# Patient Record
Sex: MALE | Race: WHITE | NOT HISPANIC OR LATINO | Employment: FULL TIME | URBAN - METROPOLITAN AREA
[De-identification: names, ages, dates, MRNs, and addresses within clinical notes are randomized per-mention and may not be internally consistent; named-entity substitution may affect disease eponyms.]

---

## 2018-08-27 ENCOUNTER — OFFICE VISIT - HEALTHEAST (OUTPATIENT)
Dept: RHEUMATOLOGY | Facility: CLINIC | Age: 53
End: 2018-08-27

## 2018-08-27 DIAGNOSIS — M13.0 POLYARTHRITIS: ICD-10-CM

## 2018-08-27 DIAGNOSIS — M25.50 POLYARTHRALGIA: ICD-10-CM

## 2018-08-27 LAB
ALBUMIN SERPL-MCNC: 4 G/DL (ref 3.5–5)
ALT SERPL W P-5'-P-CCNC: 21 U/L (ref 0–45)
BASOPHILS # BLD AUTO: 0 THOU/UL (ref 0–0.2)
BASOPHILS NFR BLD AUTO: 1 % (ref 0–2)
C REACTIVE PROTEIN LHE: 0.5 MG/DL (ref 0–0.8)
CREAT SERPL-MCNC: 0.74 MG/DL (ref 0.7–1.3)
EOSINOPHIL # BLD AUTO: 0.1 THOU/UL (ref 0–0.4)
EOSINOPHIL NFR BLD AUTO: 1 % (ref 0–6)
ERYTHROCYTE [DISTWIDTH] IN BLOOD BY AUTOMATED COUNT: 11.6 % (ref 11–14.5)
ERYTHROCYTE [SEDIMENTATION RATE] IN BLOOD BY WESTERGREN METHOD: 2 MM/HR (ref 0–15)
GFR SERPL CREATININE-BSD FRML MDRD: >60 ML/MIN/1.73M2
HCT VFR BLD AUTO: 42.2 % (ref 40–54)
HGB BLD-MCNC: 14.4 G/DL (ref 14–18)
LYMPHOCYTES # BLD AUTO: 2 THOU/UL (ref 0.8–4.4)
LYMPHOCYTES NFR BLD AUTO: 23 % (ref 20–40)
MCH RBC QN AUTO: 31.4 PG (ref 27–34)
MCHC RBC AUTO-ENTMCNC: 34.1 G/DL (ref 32–36)
MCV RBC AUTO: 92 FL (ref 80–100)
MONOCYTES # BLD AUTO: 0.6 THOU/UL (ref 0–0.9)
MONOCYTES NFR BLD AUTO: 7 % (ref 2–10)
NEUTROPHILS # BLD AUTO: 6 THOU/UL (ref 2–7.7)
NEUTROPHILS NFR BLD AUTO: 69 % (ref 50–70)
PLATELET # BLD AUTO: 171 THOU/UL (ref 140–440)
PMV BLD AUTO: 10.2 FL (ref 7–10)
RBC # BLD AUTO: 4.58 MILL/UL (ref 4.4–6.2)
RHEUMATOID FACT SERPL-ACNC: <15 IU/ML (ref 0–30)
URATE SERPL-MCNC: 5.6 MG/DL (ref 3–8)
WBC: 8.7 THOU/UL (ref 4–11)

## 2018-08-27 RX ORDER — ACETAMINOPHEN 325 MG/1
650 TABLET ORAL EVERY 6 HOURS PRN
Status: SHIPPED | COMMUNITY
Start: 2018-08-27

## 2018-08-27 RX ORDER — IBUPROFEN 200 MG
200 TABLET ORAL EVERY 6 HOURS PRN
Status: SHIPPED | COMMUNITY
Start: 2018-08-27

## 2018-08-28 LAB
ANA SER QL: 0.2 U
CCP AB SER IA-ACNC: 0.6 U/ML
HBV SURFACE AG SERPL QL IA: NEGATIVE
HCV AB SERPL QL IA: NEGATIVE

## 2018-08-29 LAB — ANCA IGG TITR SER IF: NORMAL {TITER}

## 2018-10-01 ENCOUNTER — OFFICE VISIT - HEALTHEAST (OUTPATIENT)
Dept: RHEUMATOLOGY | Facility: CLINIC | Age: 53
End: 2018-10-01

## 2018-10-01 DIAGNOSIS — M06.4 INFLAMMATORY POLYARTHRITIS (H): ICD-10-CM

## 2018-10-01 DIAGNOSIS — Z79.899 HIGH RISK MEDICATION USE: ICD-10-CM

## 2018-10-01 DIAGNOSIS — M25.50 POLYARTHRALGIA: ICD-10-CM

## 2018-10-01 ASSESSMENT — MIFFLIN-ST. JEOR: SCORE: 1684.48

## 2019-01-07 ENCOUNTER — COMMUNICATION - HEALTHEAST (OUTPATIENT)
Dept: TELEHEALTH | Facility: CLINIC | Age: 54
End: 2019-01-07

## 2019-01-07 ENCOUNTER — OFFICE VISIT - HEALTHEAST (OUTPATIENT)
Dept: RHEUMATOLOGY | Facility: CLINIC | Age: 54
End: 2019-01-07

## 2019-01-07 DIAGNOSIS — Z79.899 HIGH RISK MEDICATION USE: ICD-10-CM

## 2019-01-07 DIAGNOSIS — M06.4 INFLAMMATORY POLYARTHRITIS (H): ICD-10-CM

## 2019-01-07 DIAGNOSIS — M25.50 POLYARTHRALGIA: ICD-10-CM

## 2019-04-18 ENCOUNTER — COMMUNICATION - HEALTHEAST (OUTPATIENT)
Dept: RHEUMATOLOGY | Facility: CLINIC | Age: 54
End: 2019-04-18

## 2019-04-18 DIAGNOSIS — M25.50 POLYARTHRALGIA: ICD-10-CM

## 2019-04-18 DIAGNOSIS — M06.4 INFLAMMATORY POLYARTHRITIS (H): ICD-10-CM

## 2019-05-29 ENCOUNTER — OFFICE VISIT - HEALTHEAST (OUTPATIENT)
Dept: RHEUMATOLOGY | Facility: CLINIC | Age: 54
End: 2019-05-29

## 2019-05-29 DIAGNOSIS — M06.4 INFLAMMATORY POLYARTHRITIS (H): ICD-10-CM

## 2019-05-29 DIAGNOSIS — Z79.899 HIGH RISK MEDICATION USE: ICD-10-CM

## 2019-05-29 DIAGNOSIS — M25.50 POLYARTHRALGIA: ICD-10-CM

## 2019-05-29 LAB
ALBUMIN SERPL-MCNC: 3.1 G/DL (ref 3.5–5)
ALT SERPL W P-5'-P-CCNC: 13 U/L (ref 0–45)
CREAT SERPL-MCNC: 0.8 MG/DL (ref 0.7–1.3)
ERYTHROCYTE [DISTWIDTH] IN BLOOD BY AUTOMATED COUNT: 10.8 % (ref 11–14.5)
GFR SERPL CREATININE-BSD FRML MDRD: >60 ML/MIN/1.73M2
HCT VFR BLD AUTO: 36.9 % (ref 40–54)
HGB BLD-MCNC: 12.3 G/DL (ref 14–18)
MCH RBC QN AUTO: 30.1 PG (ref 27–34)
MCHC RBC AUTO-ENTMCNC: 33.4 G/DL (ref 32–36)
MCV RBC AUTO: 90 FL (ref 80–100)
PLATELET # BLD AUTO: 448 THOU/UL (ref 140–440)
PMV BLD AUTO: 8.5 FL (ref 7–10)
RBC # BLD AUTO: 4.1 MILL/UL (ref 4.4–6.2)
WBC: 7.3 THOU/UL (ref 4–11)

## 2019-05-29 ASSESSMENT — MIFFLIN-ST. JEOR: SCORE: 1660.44

## 2019-06-24 ENCOUNTER — RECORDS - HEALTHEAST (OUTPATIENT)
Dept: ADMINISTRATIVE | Facility: OTHER | Age: 54
End: 2019-06-24

## 2019-06-25 ENCOUNTER — COMMUNICATION - HEALTHEAST (OUTPATIENT)
Dept: RHEUMATOLOGY | Facility: CLINIC | Age: 54
End: 2019-06-25

## 2019-06-25 DIAGNOSIS — M06.4 INFLAMMATORY POLYARTHRITIS (H): ICD-10-CM

## 2019-07-29 ENCOUNTER — OFFICE VISIT - HEALTHEAST (OUTPATIENT)
Dept: RHEUMATOLOGY | Facility: CLINIC | Age: 54
End: 2019-07-29

## 2019-07-29 DIAGNOSIS — Z79.899 HIGH RISK MEDICATION USE: ICD-10-CM

## 2019-07-29 DIAGNOSIS — M25.50 POLYARTHRALGIA: ICD-10-CM

## 2019-07-29 DIAGNOSIS — M06.4 INFLAMMATORY POLYARTHRITIS (H): ICD-10-CM

## 2019-08-27 ENCOUNTER — RECORDS - HEALTHEAST (OUTPATIENT)
Dept: ADMINISTRATIVE | Facility: OTHER | Age: 54
End: 2019-08-27

## 2019-09-24 ENCOUNTER — RECORDS - HEALTHEAST (OUTPATIENT)
Dept: ADMINISTRATIVE | Facility: OTHER | Age: 54
End: 2019-09-24

## 2019-09-25 ENCOUNTER — OFFICE VISIT - HEALTHEAST (OUTPATIENT)
Dept: RHEUMATOLOGY | Facility: CLINIC | Age: 54
End: 2019-09-25

## 2019-09-25 DIAGNOSIS — M06.4 INFLAMMATORY POLYARTHRITIS (H): ICD-10-CM

## 2019-09-25 DIAGNOSIS — Z79.899 HIGH RISK MEDICATION USE: ICD-10-CM

## 2019-09-25 DIAGNOSIS — M25.50 POLYARTHRALGIA: ICD-10-CM

## 2019-10-29 ENCOUNTER — COMMUNICATION - HEALTHEAST (OUTPATIENT)
Dept: RHEUMATOLOGY | Facility: CLINIC | Age: 54
End: 2019-10-29

## 2019-10-29 DIAGNOSIS — M25.50 POLYARTHRALGIA: ICD-10-CM

## 2019-10-29 DIAGNOSIS — M06.4 INFLAMMATORY POLYARTHRITIS (H): ICD-10-CM

## 2019-11-25 ENCOUNTER — COMMUNICATION - HEALTHEAST (OUTPATIENT)
Dept: RHEUMATOLOGY | Facility: CLINIC | Age: 54
End: 2019-11-25

## 2019-11-25 DIAGNOSIS — M06.4 INFLAMMATORY POLYARTHRITIS (H): ICD-10-CM

## 2019-11-25 DIAGNOSIS — M25.50 POLYARTHRALGIA: ICD-10-CM

## 2019-12-04 ENCOUNTER — RECORDS - HEALTHEAST (OUTPATIENT)
Dept: ADMINISTRATIVE | Facility: OTHER | Age: 54
End: 2019-12-04

## 2019-12-19 ENCOUNTER — COMMUNICATION - HEALTHEAST (OUTPATIENT)
Dept: RHEUMATOLOGY | Facility: CLINIC | Age: 54
End: 2019-12-19

## 2019-12-19 DIAGNOSIS — M25.50 POLYARTHRALGIA: ICD-10-CM

## 2019-12-19 DIAGNOSIS — M06.4 INFLAMMATORY POLYARTHRITIS (H): ICD-10-CM

## 2020-02-06 ENCOUNTER — RECORDS - HEALTHEAST (OUTPATIENT)
Dept: ADMINISTRATIVE | Facility: OTHER | Age: 55
End: 2020-02-06

## 2020-02-10 ENCOUNTER — COMMUNICATION - HEALTHEAST (OUTPATIENT)
Dept: RHEUMATOLOGY | Facility: CLINIC | Age: 55
End: 2020-02-10

## 2020-02-10 DIAGNOSIS — M25.50 POLYARTHRALGIA: ICD-10-CM

## 2020-02-10 DIAGNOSIS — M06.4 INFLAMMATORY POLYARTHRITIS (H): ICD-10-CM

## 2020-02-24 ENCOUNTER — RECORDS - HEALTHEAST (OUTPATIENT)
Dept: ADMINISTRATIVE | Facility: CLINIC | Age: 55
End: 2020-02-24

## 2020-02-25 ENCOUNTER — RECORDS - HEALTHEAST (OUTPATIENT)
Dept: ADMINISTRATIVE | Facility: OTHER | Age: 55
End: 2020-02-25

## 2020-03-09 ENCOUNTER — COMMUNICATION - HEALTHEAST (OUTPATIENT)
Dept: RHEUMATOLOGY | Facility: CLINIC | Age: 55
End: 2020-03-09

## 2020-03-09 ENCOUNTER — OFFICE VISIT - HEALTHEAST (OUTPATIENT)
Dept: RHEUMATOLOGY | Facility: CLINIC | Age: 55
End: 2020-03-09

## 2020-03-09 DIAGNOSIS — M25.50 POLYARTHRALGIA: ICD-10-CM

## 2020-03-09 DIAGNOSIS — M06.4 INFLAMMATORY POLYARTHRITIS (H): ICD-10-CM

## 2020-03-09 DIAGNOSIS — Z79.899 HIGH RISK MEDICATION USE: ICD-10-CM

## 2020-03-09 ASSESSMENT — MIFFLIN-ST. JEOR: SCORE: 1676.31

## 2020-05-22 ENCOUNTER — COMMUNICATION - HEALTHEAST (OUTPATIENT)
Dept: RHEUMATOLOGY | Facility: CLINIC | Age: 55
End: 2020-05-22

## 2020-05-22 DIAGNOSIS — M06.4 INFLAMMATORY POLYARTHRITIS (H): ICD-10-CM

## 2020-05-22 DIAGNOSIS — Z79.899 HIGH RISK MEDICATION USE: ICD-10-CM

## 2020-05-27 ENCOUNTER — COMMUNICATION - HEALTHEAST (OUTPATIENT)
Dept: RHEUMATOLOGY | Facility: CLINIC | Age: 55
End: 2020-05-27

## 2020-05-27 DIAGNOSIS — M25.50 POLYARTHRALGIA: ICD-10-CM

## 2020-05-27 DIAGNOSIS — M06.4 INFLAMMATORY POLYARTHRITIS (H): ICD-10-CM

## 2020-05-28 ENCOUNTER — RECORDS - HEALTHEAST (OUTPATIENT)
Dept: ADMINISTRATIVE | Facility: OTHER | Age: 55
End: 2020-05-28

## 2020-05-28 LAB
ALT SERPL W/O P-5'-P-CCNC: 27 U/L
CREAT SERPL-MCNC: 0.6 MG/DL (ref 0.7–1.4)

## 2020-06-16 ENCOUNTER — COMMUNICATION - HEALTHEAST (OUTPATIENT)
Dept: RHEUMATOLOGY | Facility: CLINIC | Age: 55
End: 2020-06-16

## 2020-06-16 DIAGNOSIS — M25.50 POLYARTHRALGIA: ICD-10-CM

## 2020-06-16 DIAGNOSIS — M06.4 INFLAMMATORY POLYARTHRITIS (H): ICD-10-CM

## 2020-06-17 ENCOUNTER — RECORDS - HEALTHEAST (OUTPATIENT)
Dept: HEALTH INFORMATION MANAGEMENT | Facility: CLINIC | Age: 55
End: 2020-06-17

## 2020-08-06 ENCOUNTER — AMBULATORY - HEALTHEAST (OUTPATIENT)
Dept: RHEUMATOLOGY | Facility: CLINIC | Age: 55
End: 2020-08-06

## 2020-08-06 DIAGNOSIS — M06.4 INFLAMMATORY POLYARTHRITIS (H): ICD-10-CM

## 2020-08-31 ENCOUNTER — COMMUNICATION - HEALTHEAST (OUTPATIENT)
Dept: RHEUMATOLOGY | Facility: CLINIC | Age: 55
End: 2020-08-31

## 2020-08-31 DIAGNOSIS — M06.4 INFLAMMATORY POLYARTHRITIS (H): ICD-10-CM

## 2020-08-31 DIAGNOSIS — M25.50 POLYARTHRALGIA: ICD-10-CM

## 2020-09-10 ENCOUNTER — OFFICE VISIT - HEALTHEAST (OUTPATIENT)
Dept: RHEUMATOLOGY | Facility: CLINIC | Age: 55
End: 2020-09-10

## 2020-09-10 DIAGNOSIS — Z79.899 HIGH RISK MEDICATION USE: ICD-10-CM

## 2020-09-10 DIAGNOSIS — M06.4 INFLAMMATORY POLYARTHRITIS (H): ICD-10-CM

## 2020-09-10 DIAGNOSIS — M25.50 POLYARTHRALGIA: ICD-10-CM

## 2020-09-11 ENCOUNTER — RECORDS - HEALTHEAST (OUTPATIENT)
Dept: ADMINISTRATIVE | Facility: OTHER | Age: 55
End: 2020-09-11

## 2020-09-11 LAB — CREAT SERPL-MCNC: 0.8 MG/DL (ref 0.7–1.4)

## 2020-09-15 ENCOUNTER — RECORDS - HEALTHEAST (OUTPATIENT)
Dept: HEALTH INFORMATION MANAGEMENT | Facility: CLINIC | Age: 55
End: 2020-09-15

## 2020-09-28 ENCOUNTER — COMMUNICATION - HEALTHEAST (OUTPATIENT)
Dept: RHEUMATOLOGY | Facility: CLINIC | Age: 55
End: 2020-09-28

## 2020-09-28 DIAGNOSIS — M06.4 INFLAMMATORY POLYARTHRITIS (H): ICD-10-CM

## 2020-09-28 DIAGNOSIS — Z79.899 HIGH RISK MEDICATION USE: ICD-10-CM

## 2020-10-13 ENCOUNTER — COMMUNICATION - HEALTHEAST (OUTPATIENT)
Dept: RHEUMATOLOGY | Facility: CLINIC | Age: 55
End: 2020-10-13

## 2020-10-13 DIAGNOSIS — M25.50 POLYARTHRALGIA: ICD-10-CM

## 2020-10-13 DIAGNOSIS — M06.4 INFLAMMATORY POLYARTHRITIS (H): ICD-10-CM

## 2020-12-04 ENCOUNTER — RECORDS - HEALTHEAST (OUTPATIENT)
Dept: ADMINISTRATIVE | Facility: OTHER | Age: 55
End: 2020-12-04

## 2020-12-04 LAB
ALT SERPL W/O P-5'-P-CCNC: 21 U/L
CREAT SERPL-MCNC: 0.6 MG/DL (ref 0.7–1.4)
GFR ESTIMATE EXT - HISTORICAL: >60 ML/MIN/1.73M2

## 2020-12-08 ENCOUNTER — RECORDS - HEALTHEAST (OUTPATIENT)
Dept: HEALTH INFORMATION MANAGEMENT | Facility: CLINIC | Age: 55
End: 2020-12-08

## 2020-12-10 ENCOUNTER — COMMUNICATION - HEALTHEAST (OUTPATIENT)
Dept: RHEUMATOLOGY | Facility: CLINIC | Age: 55
End: 2020-12-10

## 2020-12-10 DIAGNOSIS — M25.50 POLYARTHRALGIA: ICD-10-CM

## 2020-12-10 DIAGNOSIS — M06.4 INFLAMMATORY POLYARTHRITIS (H): ICD-10-CM

## 2020-12-27 ENCOUNTER — COMMUNICATION - HEALTHEAST (OUTPATIENT)
Dept: RHEUMATOLOGY | Facility: CLINIC | Age: 55
End: 2020-12-27

## 2020-12-27 DIAGNOSIS — M06.4 INFLAMMATORY POLYARTHRITIS (H): ICD-10-CM

## 2020-12-27 DIAGNOSIS — Z79.899 HIGH RISK MEDICATION USE: ICD-10-CM

## 2021-01-15 ENCOUNTER — COMMUNICATION - HEALTHEAST (OUTPATIENT)
Dept: RHEUMATOLOGY | Facility: CLINIC | Age: 56
End: 2021-01-15

## 2021-01-15 DIAGNOSIS — M25.50 POLYARTHRALGIA: ICD-10-CM

## 2021-01-15 DIAGNOSIS — M06.4 INFLAMMATORY POLYARTHRITIS (H): ICD-10-CM

## 2021-03-02 ENCOUNTER — RECORDS - HEALTHEAST (OUTPATIENT)
Dept: ADMINISTRATIVE | Facility: OTHER | Age: 56
End: 2021-03-02

## 2021-03-02 LAB
ALT SERPL W/O P-5'-P-CCNC: 24 U/L
CREAT SERPL-MCNC: 0.8 MG/DL (ref 0.7–1.4)

## 2021-03-04 ENCOUNTER — RECORDS - HEALTHEAST (OUTPATIENT)
Dept: HEALTH INFORMATION MANAGEMENT | Facility: CLINIC | Age: 56
End: 2021-03-04

## 2021-03-10 ENCOUNTER — COMMUNICATION - HEALTHEAST (OUTPATIENT)
Dept: RHEUMATOLOGY | Facility: CLINIC | Age: 56
End: 2021-03-10

## 2021-03-10 DIAGNOSIS — M06.4 INFLAMMATORY POLYARTHRITIS (H): ICD-10-CM

## 2021-03-10 DIAGNOSIS — M25.50 POLYARTHRALGIA: ICD-10-CM

## 2021-03-11 ENCOUNTER — OFFICE VISIT - HEALTHEAST (OUTPATIENT)
Dept: RHEUMATOLOGY | Facility: CLINIC | Age: 56
End: 2021-03-11

## 2021-03-11 DIAGNOSIS — M25.50 POLYARTHRALGIA: ICD-10-CM

## 2021-03-11 DIAGNOSIS — Z79.899 HIGH RISK MEDICATION USE: ICD-10-CM

## 2021-03-11 DIAGNOSIS — D69.6 THROMBOCYTOPENIA (H): ICD-10-CM

## 2021-03-11 DIAGNOSIS — M06.4 INFLAMMATORY POLYARTHRITIS (H): ICD-10-CM

## 2021-04-09 ENCOUNTER — COMMUNICATION - HEALTHEAST (OUTPATIENT)
Dept: RHEUMATOLOGY | Facility: CLINIC | Age: 56
End: 2021-04-09

## 2021-04-09 DIAGNOSIS — Z79.899 HIGH RISK MEDICATION USE: ICD-10-CM

## 2021-04-09 DIAGNOSIS — M25.50 POLYARTHRALGIA: ICD-10-CM

## 2021-04-09 DIAGNOSIS — M06.4 INFLAMMATORY POLYARTHRITIS (H): ICD-10-CM

## 2021-04-09 RX ORDER — FOLIC ACID 1 MG/1
1 TABLET ORAL DAILY
Qty: 100 TABLET | Refills: 0 | Status: SHIPPED | OUTPATIENT
Start: 2021-04-09 | End: 2021-07-08

## 2021-04-09 RX ORDER — HYDROXYCHLOROQUINE SULFATE 200 MG/1
200 TABLET, FILM COATED ORAL 2 TIMES DAILY
Qty: 180 TABLET | Refills: 0 | Status: SHIPPED | OUTPATIENT
Start: 2021-04-09 | End: 2021-12-21

## 2021-05-29 NOTE — PROGRESS NOTES
ASSESSMENT AND PLAN:  Shaan Mart 54 y.o. male is seen here on 05/29/19 for follow-up of inflammatory polyarthritis predominantly affecting his hands, hydroxychloroquine initially help but he has been hurting more despite not pain at work, which typically makes his symptoms better.  We discussed options.  Starting methotrexate, literature on this is provided.  Starting him on 10mg/week with folic acid.  He is aware of the lab importance.  This can be either done in the clinic here or at his primary office at [place].         Diagnoses and all orders for this visit:    Inflammatory polyarthritis (H)  -     ALT (SGPT); Standing  -     Albumin; Standing  -     Creatinine; Standing  -     HM2(CBC w/o Differential); Standing  -     ALT (SGPT)  -     Albumin  -     Creatinine  -     HM2(CBC w/o Differential)  -     methotrexate 2.5 MG tablet; Take 4 tablets (10 mg total) by mouth once a week.  Dispense: 18 tablet; Refill: 0  -     folic acid (FOLVITE) 1 MG tablet; Take 1 tablet (1 mg total) by mouth daily.  Dispense: 30 tablet; Refill: 11    High risk medication use  -     ALT (SGPT); Standing  -     Albumin; Standing  -     Creatinine; Standing  -     HM2(CBC w/o Differential); Standing  -     ALT (SGPT)  -     Albumin  -     Creatinine  -     HM2(CBC w/o Differential)  -     folic acid (FOLVITE) 1 MG tablet; Take 1 tablet (1 mg total) by mouth daily.  Dispense: 30 tablet; Refill: 11    Polyarthralgia      HISTORY OF PRESENTING ILLNESS:  Shaan Mart, 54 y.o., male is here for follow-up.  He has inflammatory polyarthritis.  He noted that his pain level is troublesome at moderately severe in the hands, currently he is off work having had surgery on the left rotator cuff 4 weeks ago.  While he was working the symptoms would get worse.  Even now he noted pain level is elevated to earlier.  He has no difficulty doing most of his day-to-day activities there is no prolonged stiffness in the morning.  He is not taking  hydroxychloroquine 200 mg twice daily.  Do the house in the examination shortly.    There is no fever weight loss blurry vision eye redness mouth ulcer nausea.  While he is improved he is not to the same order as the improvement he noted with prednisone 10 mg daily. In  the past where he had pain level as high as 8/10 and interfered with a variety of day-to-day activities.  His morning stiffness is now nonexistent compared with an hour few weeks ago.  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  He reports that he has had joint pains going back several years.  He feels it probably is at least 5 years.  He has pain in his hands, wrists, ankles, feet.  He has had multiple injuries.  This is affected his right wrist where he had surgery done last year.  He is also had a fall from a ladder after which he had swelling on the left wrist.  He also recalls injury to the left ankle when he strained it.  He has had 2 ACL repairs of the right knee.  In this background he reports that the pain in the hands, ankles and the feet is most prominent issue for him.  This is bothersome first thing in the morning after he has taken ibuprofen there is some relief.  But by afternoon he has more pain.  He noted pain level to be moderately severe more so on the left and the right hand, 8.0/10, interfering with a variety of day-to-day activities.  He has stiffness in the morning that can last easily an hour and 1 hour.  He is not a smoker alcohol occasionally.  The more he is using his joints the worse it gets.  He was at chiropractors.  He took x-rays of the hands, he also had an MRI of the left ankle not too long ago.  We are going to request the reports of the MRI.  He reports ibuprofen controls his symptoms.  He has seen swelling such as of the left wrist left ankle and the MCPs.  He reports no personal or family history of psoriasis ulcerative colitis, Crohn's disease.  There is no family history of rheumatoid arthritis and ankylosing spondylitis  "or lupus.  He describes himself otherwise in good health.    Further historical information and ADL limitations as noted in the multidimensional health assessment questionnaire attached in the EMR.   ALLERGIES:Patient has no known allergies.    PAST MEDICAL/ACTIVE PROBLEMS/MEDICATION/ FAMILY HISTORY/SOCIAL DATA:  The patient has a family history of  No past medical history on file.  Social History     Tobacco Use   Smoking Status Never Smoker   Smokeless Tobacco Never Used     Patient Active Problem List   Diagnosis     Polyarthralgia     Inflammatory polyarthritis (H)     High risk medication use     Current Outpatient Medications   Medication Sig Dispense Refill     acetaminophen (TYLENOL) 325 MG tablet Take 650 mg by mouth every 6 (six) hours as needed for pain.       hydroxychloroquine (PLAQUENIL) 200 mg tablet TAKE 1 TABLET (200 MG TOTAL) BY MOUTH 2 (TWO) TIMES A DAY. 180 tablet 0     ibuprofen (ADVIL,MOTRIN) 200 MG tablet Take 200 mg by mouth every 6 (six) hours as needed for pain.       No current facility-administered medications for this visit.      DETAILED EXAMINATION  05/29/19  :  Vitals:    05/29/19 1301   BP: 120/70   Pulse: (!) 104   Weight: 181 lb 11.2 oz (82.4 kg)   Height: 5' 10\" (1.778 m)     Alert oriented. Head including the face is examined for malar rash, heliotropes, scarring, lupus pernio. Eyes examined for redness such as in episcleritis/scleritis, periorbital lesions.   Neck/ Face examined for parotid gland swelling, range of motion of neck.  Left upper and lower and right upper and lower extremities examined for tenderness, swelling, warmth of the appendicular joints, range of motion, edema, rash.  Some of the important findings included: His left arm is in a brace, he has synovitis such as MCPs #2 and 3 more prominent on the right side.  Knees without JLT bony hypertrophy right knee medial aspect.  He does not have dactylitis.        LAB / IMAGING DATA:  ALT   Date Value Ref Range Status "   08/27/2018 21 0 - 45 U/L Final     Albumin   Date Value Ref Range Status   08/27/2018 4.0 3.5 - 5.0 g/dL Final     Creatinine   Date Value Ref Range Status   08/27/2018 0.74 0.70 - 1.30 mg/dL Final       WBC   Date Value Ref Range Status   08/27/2018 8.7 4.0 - 11.0 thou/uL Final     Hemoglobin   Date Value Ref Range Status   08/27/2018 14.4 14.0 - 18.0 g/dL Final     Platelets   Date Value Ref Range Status   08/27/2018 171 140 - 440 thou/uL Final       Lab Results   Component Value Date    RF <15.0 08/27/2018    SEDRATE 2 08/27/2018

## 2021-05-30 NOTE — PROGRESS NOTES
ASSESSMENT AND PLAN:  Shaan Mart 54 y.o. male is seen here on 07/29/19 for follow-up.  He has polyarthralgia in association with inflammatory polyarthritis, without evidence serologically of autoimmunity, predominantly of his hands, he has noted improvement in swelling the pain is improved somewhat.  He has been on hydroxychloroquine, and 10 mg/week of methotrexate with folic acid.  Most recent labs are within acceptable range.  Increase methotrexate to 15 mg/week stay at the current course with hydroxychloroquine and he did have his eyes examined.  He will check his labs every 4 week.    Will meet here in 2 months. This can be either done in the clinic here or at his primary office at [place].         Diagnoses and all orders for this visit:    Inflammatory polyarthritis (H)  -     hydroxychloroquine (PLAQUENIL) 200 mg tablet; Take 1 tablet (200 mg total) by mouth 2 (two) times a day.  Dispense: 180 tablet; Refill: 0  -     methotrexate 2.5 MG tablet; Take 6 tablets (15 mg total) by mouth once a week.  Dispense: 24 tablet; Refill: 1  -     ALT (SGPT); Standing  -     Albumin; Standing  -     Creatinine; Standing  -     HM2(CBC w/o Differential); Standing    Polyarthralgia  -     hydroxychloroquine (PLAQUENIL) 200 mg tablet; Take 1 tablet (200 mg total) by mouth 2 (two) times a day.  Dispense: 180 tablet; Refill: 0  -     methotrexate 2.5 MG tablet; Take 6 tablets (15 mg total) by mouth once a week.  Dispense: 24 tablet; Refill: 1    High risk medication use      HISTORY OF PRESENTING ILLNESS:  Shaan Mart, 54 y.o., male is here for follow-up.  He has inflammatory polyarthritis.  He has rotator cuff tendinopathy.  He noted tolerability to methotrexate of which she has been taking 10 mg/week with hydroxychloroquine, folic acid daily.  Most recent labs reviewed within acceptable range, at her son.  He has noted pain level to be mild this is somewhat better than before.  More impressive has been lessening of the  swelling in the third MCP areas.  He has residual pain in his shoulders from the prior rotator cuff lesions.  He is able to do most of his day-to-day activities without any or with some difficulty.   There is no fever weight loss blurry vision eye redness mouth ulcer nausea.  He noted pain level now down to 4.0/10 from the high of 8.0/10.  As noted in the previous visit I n the past where he had pain level as high as 8/10 and interfered with a variety of day-to-day activities.  His morning stiffness is now nonexistent compared with an hour few weeks ago.  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  He reports that he has had joint pains going back several years.  He feels it probably is at least 5 years.  He has pain in his hands, wrists, ankles, feet.  He has had multiple injuries.  This is affected his right wrist where he had surgery done last year.  He is also had a fall from a ladder after which he had swelling on the left wrist.  He also recalls injury to the left ankle when he strained it.  He has had 2 ACL repairs of the right knee.  In this background he reports that the pain in the hands, ankles and the feet is most prominent issue for him.  This is bothersome first thing in the morning after he has taken ibuprofen there is some relief.  But by afternoon he has more pain.  He noted pain level to be moderately severe more so on the left and the right hand, 8.0/10, interfering with a variety of day-to-day activities.  He has stiffness in the morning that can last easily an hour and 1 hour.  He is not a smoker alcohol occasionally.  The more he is using his joints the worse it gets.  He was at chiropractors.  He took x-rays of the hands, he also had an MRI of the left ankle not too long ago.  We are going to request the reports of the MRI.  He reports ibuprofen controls his symptoms.  He has seen swelling such as of the left wrist left ankle and the MCPs.  He reports no personal or family history of psoriasis ulcerative  colitis, Crohn's disease.  There is no family history of rheumatoid arthritis and ankylosing spondylitis or lupus.  He describes himself otherwise in good health.    Further historical information and ADL limitations as noted in the multidimensional health assessment questionnaire attached in the EMR.   ALLERGIES:Patient has no known allergies.    PAST MEDICAL/ACTIVE PROBLEMS/MEDICATION/ FAMILY HISTORY/SOCIAL DATA:  The patient has a family history of  No past medical history on file.  Social History     Tobacco Use   Smoking Status Never Smoker   Smokeless Tobacco Never Used     Patient Active Problem List   Diagnosis     Polyarthralgia     Inflammatory polyarthritis (H)     High risk medication use     Current Outpatient Medications   Medication Sig Dispense Refill     acetaminophen (TYLENOL) 325 MG tablet Take 650 mg by mouth every 6 (six) hours as needed for pain.       folic acid (FOLVITE) 1 MG tablet Take 1 tablet (1 mg total) by mouth daily. 30 tablet 11     ibuprofen (ADVIL,MOTRIN) 200 MG tablet Take 200 mg by mouth every 6 (six) hours as needed for pain.       methotrexate 2.5 MG tablet Take 6 tablets (15 mg total) by mouth once a week. 24 tablet 1     hydroxychloroquine (PLAQUENIL) 200 mg tablet Take 1 tablet (200 mg total) by mouth 2 (two) times a day. 180 tablet 0     No current facility-administered medications for this visit.      DETAILED EXAMINATION  07/29/19  :  Vitals:    07/29/19 0935   BP: 124/80   Patient Site: Right Arm   Patient Position: Sitting   Cuff Size: Adult Regular   Temp: (!) 84  F (28.9  C)   Weight: 181 lb (82.1 kg)     Alert oriented. Head including the face is examined for malar rash, heliotropes, scarring, lupus pernio. Eyes examined for redness such as in episcleritis/scleritis, periorbital lesions.   Neck/ Face examined for parotid gland swelling, range of motion of neck.  Left upper and lower and right upper and lower extremities examined for tenderness, swelling, warmth of  the appendicular joints, range of motion, edema, rash.  Some of the important findings included: Mild tenderness on the left third MCP moderate on the right side, with subtle swelling yet on the right third MCP.    He does not have dactylitis.        LAB / IMAGING DATA:  ALT   Date Value Ref Range Status   05/29/2019 13 0 - 45 U/L Final   08/27/2018 21 0 - 45 U/L Final     Albumin   Date Value Ref Range Status   05/29/2019 3.1 (L) 3.5 - 5.0 g/dL Final   08/27/2018 4.0 3.5 - 5.0 g/dL Final     Creatinine   Date Value Ref Range Status   05/29/2019 0.80 0.70 - 1.30 mg/dL Final   08/27/2018 0.74 0.70 - 1.30 mg/dL Final       WBC   Date Value Ref Range Status   05/29/2019 7.3 4.0 - 11.0 thou/uL Final   08/27/2018 8.7 4.0 - 11.0 thou/uL Final     Hemoglobin   Date Value Ref Range Status   05/29/2019 12.3 (L) 14.0 - 18.0 g/dL Final   08/27/2018 14.4 14.0 - 18.0 g/dL Final     Platelets   Date Value Ref Range Status   05/29/2019 448 (H) 140 - 440 thou/uL Final   08/27/2018 171 140 - 440 thou/uL Final       Lab Results   Component Value Date    RF <15.0 08/27/2018    SEDRATE 2 08/27/2018

## 2021-06-01 VITALS — WEIGHT: 187 LBS

## 2021-06-01 NOTE — PROGRESS NOTES
ASSESSMENT AND PLAN:  Shaan Mart 54 y.o. male is seen here on 09/25/19 for follow-up of inflammatory polyarthritis, without serologic evidence of autoimmunity, doing great with methotrexate and hydroxychloroquine recent labs are done at his primary clinic at [facility] reviewed within normal range.  He had right shoulder rotator cuff surgery recently making good progress.  He is to continue the current regimen.  He wondered how long should he stay on this.  We discussed this.  Reminded him of eye examination.  Follow-up at this time in 3 months with labs just prior.            Diagnoses and all orders for this visit:    Inflammatory polyarthritis (H)  -     methotrexate 2.5 MG tablet; Take 6 tablets (15 mg total) by mouth once a week.  Dispense: 24 tablet; Refill: 1    Polyarthralgia  -     methotrexate 2.5 MG tablet; Take 6 tablets (15 mg total) by mouth once a week.  Dispense: 24 tablet; Refill: 1    High risk medication use      HISTORY OF PRESENTING ILLNESS:  Shaan Mart, 54 y.o., male is here for follow-up.  He has inflammatory polyarthritis.  He has rotator cuff tendinopathy.  He has been doing so much better.  There is no pain apart from his right shoulder which she had operated on the few days ago.  He has reported no stiffness swelling or pain in his hands.  He is able to do all his day-to-day activities from that perspective.  He has tolerated increased dose of methotrexate, hydroxychloroquine.  Recent labs done at [facility] were within normal range.  He is on folic acid.    There is no fever weight loss blurry vision eye redness mouth ulcer nausea.  He noted pain level now down to 4.0/10 from the high of 8.0/10.  As noted in the previous visit I n the past where he had pain level as high as 8/10 and interfered with a variety of day-to-day activities.  His morning stiffness is now nonexistent compared with an hour few weeks ago.  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  He feels it probably is at least 5 years.  He has pain in his hands, wrists, ankles, feet.  He has had multiple injuries.  This is affected his right wrist where he had surgery done last year.  He is also had a fall from a ladder after which he had swelling on the left wrist.  He also recalls injury to the left ankle when he strained it.  He has had 2 ACL repairs of the right knee.  In this background he reports that the pain in the hands, ankles and the feet is most prominent issue for him.  This is bothersome first thing in the morning after he has taken ibuprofen there is some relief.  But by afternoon he has more pain.  He noted pain level to be moderately severe more so on the left and the right hand, 8.0/10, interfering with a variety of day-to-day activities.  He has stiffness in the morning that can last easily an hour and 1 hour.  He is not a smoker alcohol occasionally.  The more he is using his joints the worse it gets.  He was at chiropractors.  He took x-rays of the hands, he also had an MRI of the left ankle not too long ago.  We are going to request the reports of the MRI.  He reports ibuprofen controls his symptoms.  He has seen swelling such as of the left wrist left ankle and the MCPs.  He reports no personal or family history of psoriasis ulcerative colitis, Crohn's disease.  There is no family history of rheumatoid arthritis and ankylosing spondylitis or lupus.  He describes himself otherwise in good health.    Further historical information and ADL limitations as noted in the multidimensional health assessment questionnaire attached in the EMR.   ALLERGIES:Patient has no known allergies.    PAST MEDICAL/ACTIVE PROBLEMS/MEDICATION/ FAMILY HISTORY/SOCIAL DATA:  The patient has a family history of  No past medical history on file.  Social History     Tobacco Use   Smoking Status Never Smoker   Smokeless Tobacco Never Used     Patient Active Problem List    Diagnosis     Polyarthralgia     Inflammatory polyarthritis (H)     High risk medication use     Current Outpatient Medications   Medication Sig Dispense Refill     acetaminophen (TYLENOL) 325 MG tablet Take 650 mg by mouth every 6 (six) hours as needed for pain.       folic acid (FOLVITE) 1 MG tablet Take 1 tablet (1 mg total) by mouth daily. 30 tablet 11     hydroxychloroquine (PLAQUENIL) 200 mg tablet Take 1 tablet (200 mg total) by mouth 2 (two) times a day. 180 tablet 0     ibuprofen (ADVIL,MOTRIN) 200 MG tablet Take 200 mg by mouth every 6 (six) hours as needed for pain.       methotrexate 2.5 MG tablet Take 6 tablets (15 mg total) by mouth once a week. 24 tablet 1     No current facility-administered medications for this visit.      DETAILED EXAMINATION  09/25/19  :  Vitals:    09/25/19 0754   BP: 120/80   Patient Site: Right Arm   Patient Position: Sitting   Cuff Size: Adult Large   Pulse: 92   Weight: 181 lb (82.1 kg)     Alert oriented. Head including the face is examined for malar rash, heliotropes, scarring, lupus pernio. Eyes examined for redness such as in episcleritis/scleritis, periorbital lesions.   Neck/ Face examined for parotid gland swelling, range of motion of neck.  Left upper and lower and right upper and lower extremities examined for tenderness, swelling, warmth of the appendicular joints, range of motion, edema, rash.  Some of the important findings included: He does not have synovitis in any of the palpable joints of upper extremities, his right shoulder and upper extremity in the brace.  Knees without effusion warmth or JLT.      LAB / IMAGING DATA:  ALT   Date Value Ref Range Status   05/29/2019 13 0 - 45 U/L Final   08/27/2018 21 0 - 45 U/L Final     Albumin   Date Value Ref Range Status   05/29/2019 3.1 (L) 3.5 - 5.0 g/dL Final   08/27/2018 4.0 3.5 - 5.0 g/dL Final     Creatinine   Date Value Ref Range Status   05/29/2019 0.80 0.70 - 1.30 mg/dL Final   08/27/2018 0.74 0.70 - 1.30  mg/dL Final       WBC   Date Value Ref Range Status   05/29/2019 7.3 4.0 - 11.0 thou/uL Final   08/27/2018 8.7 4.0 - 11.0 thou/uL Final     Hemoglobin   Date Value Ref Range Status   05/29/2019 12.3 (L) 14.0 - 18.0 g/dL Final   08/27/2018 14.4 14.0 - 18.0 g/dL Final     Platelets   Date Value Ref Range Status   05/29/2019 448 (H) 140 - 440 thou/uL Final   08/27/2018 171 140 - 440 thou/uL Final       Lab Results   Component Value Date    RF <15.0 08/27/2018    SEDRATE 2 08/27/2018

## 2021-06-02 ENCOUNTER — RECORDS - HEALTHEAST (OUTPATIENT)
Dept: ADMINISTRATIVE | Facility: OTHER | Age: 56
End: 2021-06-02

## 2021-06-02 VITALS — BODY MASS INDEX: 26.77 KG/M2 | HEIGHT: 70 IN | WEIGHT: 187 LBS

## 2021-06-02 VITALS — WEIGHT: 187 LBS | BODY MASS INDEX: 26.83 KG/M2

## 2021-06-02 LAB
ALT SERPL W/O P-5'-P-CCNC: 39 U/L
CREAT SERPL-MCNC: 0.6 MG/DL (ref 0.7–1.4)
GFR ESTIMATE EXT - HISTORICAL: >60 ML/MIN/1.73M2

## 2021-06-03 VITALS — WEIGHT: 181 LBS | BODY MASS INDEX: 25.97 KG/M2

## 2021-06-03 VITALS
WEIGHT: 181 LBS | SYSTOLIC BLOOD PRESSURE: 120 MMHG | DIASTOLIC BLOOD PRESSURE: 80 MMHG | BODY MASS INDEX: 25.97 KG/M2 | HEART RATE: 92 BPM

## 2021-06-03 VITALS — BODY MASS INDEX: 26.01 KG/M2 | WEIGHT: 181.7 LBS | HEIGHT: 70 IN

## 2021-06-03 NOTE — TELEPHONE ENCOUNTER
Last office visit: 9/25/2019    Last Lab:7/29/19    Future office visit:1/6/2020     Future Lab:n/a

## 2021-06-04 VITALS
HEART RATE: 72 BPM | DIASTOLIC BLOOD PRESSURE: 76 MMHG | WEIGHT: 185.2 LBS | BODY MASS INDEX: 26.51 KG/M2 | HEIGHT: 70 IN | SYSTOLIC BLOOD PRESSURE: 124 MMHG

## 2021-06-06 NOTE — TELEPHONE ENCOUNTER
Please advise on when to schedule.    Priority: Routine Class: Internal Referral   Standing Status: Normal Status: Sent [2]   Ordering User: Fabi Salazar MD Department: [facility]   Auth Provider: FABI SALAZAR Enc Provider: Fabi Salazar MD   Diagnosis: Lung infection

## 2021-06-06 NOTE — PROGRESS NOTES
ASSESSMENT AND PLAN:  Shaan Mart 54 y.o. male is seen here on 03/09/20 for follow-up.  He has inflammatory polyarthritis undifferentiated doing very well with the current regimen of methotrexate hydroxychloroquine recent labs are normal, had eyes examined earlier in February within normal range noted.  He is making good progress subsequent to surgery in the left rotator cuff.  He is able to do almost all his day-to-day activities without difficulty.  We will stay the course will meet here in 6 months.  Labs every 3 months.                Diagnoses and all orders for this visit:    Inflammatory polyarthritis (H)  -     methotrexate 2.5 MG tablet; Take 6 tablets (15 mg total) by mouth once a week.  Dispense: 72 tablet; Refill: 0  -     hydroxychloroquine (PLAQUENIL) 200 mg tablet; Take 1 tablet (200 mg total) by mouth 2 (two) times a day.  Dispense: 180 tablet; Refill: 0  -     folic acid (FOLVITE) 1 MG tablet; Take 1 tablet (1 mg total) by mouth daily.  Dispense: 90 tablet; Refill: 0    Polyarthralgia  -     methotrexate 2.5 MG tablet; Take 6 tablets (15 mg total) by mouth once a week.  Dispense: 72 tablet; Refill: 0  -     hydroxychloroquine (PLAQUENIL) 200 mg tablet; Take 1 tablet (200 mg total) by mouth 2 (two) times a day.  Dispense: 180 tablet; Refill: 0    High risk medication use  -     folic acid (FOLVITE) 1 MG tablet; Take 1 tablet (1 mg total) by mouth daily.  Dispense: 90 tablet; Refill: 0      HISTORY OF PRESENTING ILLNESS:  Shaan Mart, 54 y.o., male is here for follow-up.  He has inflammatory polyarthritis.  He has rotator cuff tendinopathy.  With methotrexate, hydroxychloroquine, daily folic acid he has done great.  He noted pain level to be 0.  Able do all his day-to-day activities without difficulty, no stiffness in the morning.  He had his eyes examined in February within normal range for hydroxychloroquine toxicity measures.  Labs drawn a week or so ago and lack local clinic and hurts in  [place] also available and within normal range.  Since September 2019 visit he has not had a flareup of his joint symptoms.  He is making gradual progress in the left shoulder pain.   There is no fever weight loss blurry vision eye redness mouth ulcer nausea.  The more he is using his joints the worse it gets.  He was at chiropractors.  He took x-rays of the hands, he also had an MRI of the left ankle not too long ago.  We are going to request the reports of the MRI.  He reports ibuprofen controls his symptoms.  He has seen swelling such as of the left wrist left ankle and the MCPs.  He reports no personal or family history of psoriasis ulcerative colitis, Crohn's disease.  There is no family history of rheumatoid arthritis and ankylosing spondylitis or lupus.  He describes himself otherwise in good health.    Further historical information and ADL limitations as noted in the multidimensional health assessment questionnaire attached in the EMR.     Following is the excerpt from a recent note:  I n the past where he had pain level as high as 8/10 and interfered with a variety of day-to-day activities.  His morning stiffness is now nonexistent compared with an hour few weeks ago.  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  He reports that he has had joint pains going back several years.  He feels it probably is at least 5 years.  He has pain in his hands, wrists, ankles, feet.  He has had multiple injuries.  This is affected his right wrist where he had surgery done last year.  He is also had a fall from a ladder after which he had swelling on the left wrist.  He also recalls injury to the left ankle when he strained it.  He has had 2 ACL repairs of the right knee.  In this background he reports that the pain in the hands, ankles and the feet is most prominent issue for him.  This is bothersome first thing in the morning after he has taken ibuprofen there is some relief.  But by afternoon he has more pain.  He noted pain level  "to be moderately severe more so on the left and the right hand, 8.0/10, interfering with a variety of day-to-day activities.  He has stiffness in the morning that can last easily an hour and 1 hour.  He is not a smoker alcohol occasionally. ALLERGIES:Patient has no known allergies.    PAST MEDICAL/ACTIVE PROBLEMS/MEDICATION/ FAMILY HISTORY/SOCIAL DATA:  The patient has a family history of  No past medical history on file.  Social History     Tobacco Use   Smoking Status Never Smoker   Smokeless Tobacco Never Used     Patient Active Problem List   Diagnosis     Polyarthralgia     Inflammatory polyarthritis (H)     High risk medication use     Current Outpatient Medications   Medication Sig Dispense Refill     folic acid (FOLVITE) 1 MG tablet Take 1 tablet (1 mg total) by mouth daily. 30 tablet 11     hydroxychloroquine (PLAQUENIL) 200 mg tablet TAKE 1 TABLET (200 MG TOTAL) BY MOUTH 2 (TWO) TIMES A DAY. 120 tablet 0     methotrexate 2.5 MG tablet TAKE 6 TABLETS (15 MG TOTAL) BY MOUTH ONCE A WEEK. 24 tablet 0     acetaminophen (TYLENOL) 325 MG tablet Take 650 mg by mouth every 6 (six) hours as needed for pain.       ibuprofen (ADVIL,MOTRIN) 200 MG tablet Take 200 mg by mouth every 6 (six) hours as needed for pain.       No current facility-administered medications for this visit.      DETAILED EXAMINATION  03/09/20  :  Vitals:    03/09/20 1535   BP: 124/76   Pulse: 72   Weight: 185 lb 3.2 oz (84 kg)   Height: 5' 10\" (1.778 m)     Alert oriented. Head including the face is examined for malar rash, heliotropes, scarring, lupus pernio. Eyes examined for redness such as in episcleritis/scleritis, periorbital lesions.   Neck/ Face examined for parotid gland swelling, range of motion of neck.  Left upper and lower and right upper and lower extremities examined for tenderness, swelling, warmth of the appendicular joints, range of motion, edema, rash.  Some of the important findings included: There is no synovitis and palpable " joints of upper extremities and normal range of motion of the left shoulder nearly so on the right side with some impingement on internal rotation knees without effusion warmth or joint line tenderness.        LAB / IMAGING DATA:  ALT   Date Value Ref Range Status   05/29/2019 13 0 - 45 U/L Final   08/27/2018 21 0 - 45 U/L Final     Albumin   Date Value Ref Range Status   05/29/2019 3.1 (L) 3.5 - 5.0 g/dL Final   08/27/2018 4.0 3.5 - 5.0 g/dL Final     Creatinine   Date Value Ref Range Status   05/29/2019 0.80 0.70 - 1.30 mg/dL Final   08/27/2018 0.74 0.70 - 1.30 mg/dL Final       WBC   Date Value Ref Range Status   05/29/2019 7.3 4.0 - 11.0 thou/uL Final   08/27/2018 8.7 4.0 - 11.0 thou/uL Final     Hemoglobin   Date Value Ref Range Status   05/29/2019 12.3 (L) 14.0 - 18.0 g/dL Final   08/27/2018 14.4 14.0 - 18.0 g/dL Final     Platelets   Date Value Ref Range Status   05/29/2019 448 (H) 140 - 440 thou/uL Final   08/27/2018 171 140 - 440 thou/uL Final       Lab Results   Component Value Date    RF <15.0 08/27/2018    SEDRATE 2 08/27/2018

## 2021-06-08 ENCOUNTER — RECORDS - HEALTHEAST (OUTPATIENT)
Dept: HEALTH INFORMATION MANAGEMENT | Facility: CLINIC | Age: 56
End: 2021-06-08

## 2021-06-11 NOTE — PROGRESS NOTES
"Shaan Mart is a 55 y.o. male who is being evaluated via a billable video visit.      The patient has been notified of following:     \"This video visit will be conducted via a call between you and your physician/provider. We have found that certain health care needs can be provided without the need for an in-person physical exam.  This service lets us provide the care you need with a video conversation.  If a prescription is necessary we can send it directly to your pharmacy.  If lab work is needed we can place an order for that and you can then stop by our lab to have the test done at a later time.    Video visits are billed at different rates depending on your insurance coverage. Please reach out to your insurance provider with any questions.    If during the course of the call the physician/provider feels a video visit is not appropriate, you will not be charged for this service.\"    Patient has given verbal consent to a Video visit? Yes  How would you like to obtain your AVS? AVS Preference: MyChart.  If dropped by the video visit, the video invitation should be sent to: Text to cell phone: [phone]   Will anyone else be joining your video visit? No        Video-Visit Details    Type of service:  Video Visit    Originating Location (pt. Location): Home    Distant Location (provider location):  [facility]     Platform used for Video Visit: Adolfo     Pt states he's in [place] while he's taking this appointment.     ASSESSMENT AND PLAN:    Diagnoses and all orders for this visit:    Inflammatory polyarthritis (H)  -     hydrOXYchloroQUINE (PLAQUENIL) 200 mg tablet; Take 1 tablet (200 mg total) by mouth 2 (two) times a day.  Dispense: 180 tablet; Refill: 0    Polyarthralgia  -     hydrOXYchloroQUINE (PLAQUENIL) 200 mg tablet; Take 1 tablet (200 mg total) by mouth 2 (two) times a day.  Dispense: 180 tablet; Refill: 0    High risk medication use          HISTORY OF PRESENTING ILLNESS:  Shaan MCKEON" Barron 55 y.o. is evaluated here via video link.  This is for follow-up.  He has inflammatory polyarthritis, rotator cuff tendinopathy.  Noted this going on for several years.  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  He is on methotrexate, hydroxychloroquine.  Most recent labs were done about approximately 3 months ago and were normal.  He is going to go to the labs  tomorrow to do the labs which are due for methotrexate.  Reminded of him eye examination.  He has not had a flareup of his joint symptoms.  He has not had swelling with nothing and wanting treatment continue the current.  He reports no personal or family history of psoriasis ulcerative colitis, Crohn's disease.  There is no family history of rheumatoid arthritis and ankylosing spondylitis or lupus.  He describes himself otherwise in good health.  He is certified that he is in [place] currently.  Reminded of him eye examination.  Follow-up here in 6 months, labs every 3.    . ROS enquiry held for fever, ocular symptoms, rash, headache,  GI issues.  Today we also discussed the issues related to the current pandemic, the pros and cons of the current treatment plan, the CDC guidelines such as social distancing washing the hands covering the cough.  ALLERGIES:Patient has no known allergies.    PAST MEDICAL/ACTIVE PROBLEMS/MEDICATION/SOCIAL DATA  No past medical history on file.  Social History     Tobacco Use   Smoking Status Never Smoker   Smokeless Tobacco Never Used     Patient Active Problem List   Diagnosis     Polyarthralgia     Inflammatory polyarthritis (H)     High risk medication use     Current Outpatient Medications   Medication Sig Dispense Refill     acetaminophen (TYLENOL) 325 MG tablet Take 650 mg by mouth every 6 (six) hours as needed for pain.       hydrOXYchloroQUINE (PLAQUENIL)  200 mg tablet TAKE 1 TABLET (200 MG TOTAL) BY MOUTH 2 (TWO) TIMES A DAY. 180 tablet 0     ibuprofen (ADVIL,MOTRIN) 200 MG tablet Take 200 mg by mouth every 6 (six) hours as needed for pain.       folic acid (FOLVITE) 1 MG tablet TAKE 1 TABLET (1 MG TOTAL) BY MOUTH DAILY. 90 tablet 0     methotrexate 2.5 MG tablet TAKE 6 TABLETS (15 MG TOTAL) BY MOUTH ONCE A WEEK. 72 tablet 0     No current facility-administered medications for this visit.          EXAMINATION:    Using the audio and video link as best as possible the constitutional, neck, neurologic, psych, skin, both upper extremities areas/organ system were evaluated during this assessment.  Some of the important findings: No swelling of the digits able to fully flex into fist bilaterally abduction and external rotation at the shoulder is normal.  He does not have dactylitis of digits.      LAB / IMAGING DATA:  ALT_EXT   Date Value Ref Range Status   05/28/2020 27 <50 U/L Final     ALT   Date Value Ref Range Status   05/29/2019 13 0 - 45 U/L Final   08/27/2018 21 0 - 45 U/L Final     Albumin   Date Value Ref Range Status   05/29/2019 3.1 (L) 3.5 - 5.0 g/dL Final   08/27/2018 4.0 3.5 - 5.0 g/dL Final     Creatinine   Date Value Ref Range Status   05/28/2020 0.60 (!) 0.70 - 1.40 mg/dL Final   05/29/2019 0.80 0.70 - 1.30 mg/dL Final   08/27/2018 0.74 0.70 - 1.30 mg/dL Final       WBC   Date Value Ref Range Status   05/29/2019 7.3 4.0 - 11.0 thou/uL Final   08/27/2018 8.7 4.0 - 11.0 thou/uL Final     Hemoglobin   Date Value Ref Range Status   05/29/2019 12.3 (L) 14.0 - 18.0 g/dL Final   08/27/2018 14.4 14.0 - 18.0 g/dL Final     Platelets   Date Value Ref Range Status   05/29/2019 448 (H) 140 - 440 thou/uL Final   08/27/2018 171 140 - 440 thou/uL Final       Lab Results   Component Value Date    RF <15.0 08/27/2018    SEDRATE 2 08/27/2018     Duration of the call:6  Minutes  Call start: 1018  am  Call end:   1024 am

## 2021-06-15 NOTE — PROGRESS NOTES
Shaan Mart is a 55 y.o. male who is being evaluated via a billable video visit.      How would you like to obtain your AVS?   If dropped from the video visit, the video invitation should be resent by: Text to cell phone: [phone]  Will anyone else be joining your video visit? No      Video Start Time: 10:50 AM  Video-Visit Details    Type of service:  Video Visit    Video End Time (time video stopped): 11:01 AM  Originating Location (pt. Location): Home    Distant Location (provider [facility]     Platform used for Video Visit: Chumby    This document was created using a software with less than 100% fidelity, at times resulting in unintended, even erroneous syntax and grammar.  The reader is advised to keep this under consideration while reviewing, interpreting this note.           ASSESSMENT AND PLAN:    Diagnoses and all orders for this visit:    Inflammatory polyarthritis (H)    High risk medication use    Polyarthralgia    Thrombocytopenia (H)        Follow up in 3 months      HISTORY OF PRESENTING ILLNESS:  Shaan Mart 55 y.o. is evaluated here via video/audio link.  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  He is on methotrexate, hydroxychloroquine.  Recent labs are reviewed.  He has thrombocytopenia without any clinical manifestations of bleeding diathesis no bruising.  He had his eyes examined recently and was reassured.  He is happy with things are and they wanted to cut back on methotrexate, he will drop it down to 7.5 mg/week, he knows #1 that not all joint pains are necessarily inflammatory arthritis #2 should there be a flareup he will need to go back on the same dose of methotrexate with or without prednisone, and if there is none then in 3 months we can consider  dropping the methotrexate altogether.  He reports no personal or family history of psoriasis ulcerative colitis, Crohn's disease.  There is no family history of rheumatoid arthritis and ankylosing spondylitis or lupus.  He describes himself otherwise in good health.  He is certified that he is in [place] currently.   ROS enquiry held for fever, ocular symptoms, rash, headache,  GI issues.  Today we also discussed the issues related to the current pandemic, the pros and cons of the current treatment plan, the CDC guidelines such as social distancing washing the hands covering the cough.  ALLERGIES:Patient has no known allergies.    PAST MEDICAL/ACTIVE PROBLEMS/MEDICATION/SOCIAL DATA  No past medical history on file.  Social History     Tobacco Use   Smoking Status Never Smoker   Smokeless Tobacco Never Used     Patient Active Problem List   Diagnosis     Polyarthralgia     Inflammatory polyarthritis (H)     High risk medication use     Current Outpatient Medications   Medication Sig Dispense Refill     folic acid (FOLVITE) 1 MG tablet TAKE 1 TABLET (1 MG TOTAL) BY MOUTH DAILY. 100 tablet 0     hydrOXYchloroQUINE (PLAQUENIL) 200 mg tablet TAKE 1 TABLET (200 MG TOTAL) BY MOUTH 2 (TWO) TIMES A DAY. 180 tablet 0     methotrexate 2.5 MG tablet TAKE 6 TABLETS (15 MG TOTAL) BY MOUTH ONCE A WEEK. 72 tablet 0     acetaminophen (TYLENOL) 325 MG tablet Take 650 mg by mouth every 6 (six) hours as needed for pain.       ibuprofen (ADVIL,MOTRIN) 200 MG tablet Take 200 mg by mouth every 6 (six) hours as needed for pain.       No current facility-administered medications for this visit.          EXAMINATION:    Using the audio and video link as best as possible the constitutional, neck, neurologic, psych, skin, both upper extremities areas/organ system were evaluated during this assessment.  Some of the important findings: Alert, oriented, speech fluent.    Able to fully flex the digits, into fists bilaterally, wrist and elbow  range of motion appear normal, abduction of the shoulder is normal.      LAB / IMAGING DATA:  ALT_EXT   Date Value Ref Range Status   03/02/2021 24 <50 U/L Final   12/04/2020 21 <50 U/L Final   05/28/2020 27 <50 U/L Final     Albumin   Date Value Ref Range Status   05/29/2019 3.1 (L) 3.5 - 5.0 g/dL Final   08/27/2018 4.0 3.5 - 5.0 g/dL Final     Creatinine   Date Value Ref Range Status   03/02/2021 0.80 0.70 - 1.40 mg/dL Final   12/04/2020 0.60 (L) 0.70 - 1.40 mg/dL Final   09/11/2020 0.80 0.70 - 1.40 mg/dL Final       WBC   Date Value Ref Range Status   05/29/2019 7.3 4.0 - 11.0 thou/uL Final   08/27/2018 8.7 4.0 - 11.0 thou/uL Final     Hemoglobin   Date Value Ref Range Status   05/29/2019 12.3 (L) 14.0 - 18.0 g/dL Final   08/27/2018 14.4 14.0 - 18.0 g/dL Final     Platelets   Date Value Ref Range Status   05/29/2019 448 (H) 140 - 440 thou/uL Final   08/27/2018 171 140 - 440 thou/uL Final       Lab Results   Component Value Date    RF <15.0 08/27/2018    SEDRATE 2 08/27/2018

## 2021-06-16 PROBLEM — M06.4 INFLAMMATORY POLYARTHRITIS (H): Status: ACTIVE | Noted: 2018-10-01

## 2021-06-16 PROBLEM — M25.50 POLYARTHRALGIA: Status: ACTIVE | Noted: 2018-08-27

## 2021-06-16 PROBLEM — Z79.899 HIGH RISK MEDICATION USE: Status: ACTIVE | Noted: 2018-10-01

## 2021-06-20 NOTE — PROGRESS NOTES
ASSESSMENT AND PLAN:  Shaan Mart 53 y.o. male is seen here on 10/01/18 for follow-up of polyarthralgias he has ample evidence of inflammatory polyarthritis currently undifferentiated.  His workup is negative for evidence of autoimmunity.  He responded very nicely both subjectively and objectively to modest dose of prednisone that he is going to taper over the next near future to 0.  Suggested today that he consider starting hydroxychloroquine major side effects including cutaneous GI ocular were discussed.  Literature from [employer] provided.  Will meet here in 3 months.      Diagnoses and all orders for this visit:    Inflammatory polyarthritis (H)  -     hydroxychloroquine (PLAQUENIL) 200 mg tablet; Take 1 tablet (200 mg total) by mouth 2 (two) times a day.  Dispense: 60 tablet; Refill: 6    Polyarthralgia    High risk medication use      HISTORY OF PRESENTING ILLNESS:  Shaan Mart, 53 y.o., male is here for follow-up.  He was seen here recently end of August.  He reports there has been very significant improvement with the prednisone 10 mg.  Took about 48-72 hours for this to start working.  His intake of ibuprofen decreased dramatically.  The swelling and pain improved significantly.  He is able to do all his day-to-day activities without any difficulty.  He rated pain level of 2.0/10 and that pertains to the back and the ball of the foot and not other joint areas that were affected previously.  There is a marked contrast to the past where he had pain level as high as 8/10 and interfered with a variety of day-to-day activities.  His morning stiffness is now nonexistent compared with an hour few weeks ago.  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  He reports that he has had joint pains going back several years.  He feels it probably is at least 5 years.  He has pain in his hands, wrists, ankles, feet.  He has had multiple injuries.  This is affected his right wrist where he had surgery done last  year.  He is also had a fall from a ladder after which he had swelling on the left wrist.  He also recalls injury to the left ankle when he strained it.  He has had 2 ACL repairs of the right knee.  In this background he reports that the pain in the hands, ankles and the feet is most prominent issue for him.  This is bothersome first thing in the morning after he has taken ibuprofen there is some relief.  But by afternoon he has more pain.  He noted pain level to be moderately severe more so on the left and the right hand, 8.0/10, interfering with a variety of day-to-day activities.  He has stiffness in the morning that can last easily an hour and 1 hour.  He is not a smoker alcohol occasionally.  The more he is using his joints the worse it gets.  He was at chiropractors.  He took x-rays of the hands, he also had an MRI of the left ankle not too long ago.  We are going to request the reports of the MRI.  He reports ibuprofen controls his symptoms.  He has seen swelling such as of the left wrist left ankle and the MCPs.  He reports no personal or family history of psoriasis ulcerative colitis, Crohn's disease.  There is no family history of rheumatoid arthritis and ankylosing spondylitis or lupus.  He describes himself otherwise in good health.    Further historical information and ADL limitations as noted in the multidimensional health assessment questionnaire attached in the EMR.   ALLERGIES:Review of patient's allergies indicates no known allergies.    PAST MEDICAL/ACTIVE PROBLEMS/MEDICATION/ FAMILY HISTORY/SOCIAL DATA:  The patient has a family history of  No past medical history on file.  History   Smoking Status     Never Smoker   Smokeless Tobacco     Never Used     Patient Active Problem List   Diagnosis     Polyarthralgia     Polyarthritis     Current Outpatient Prescriptions   Medication Sig Dispense Refill     acetaminophen (TYLENOL) 325 MG tablet Take 650 mg by mouth every 6 (six) hours as needed for  "pain.       ibuprofen (ADVIL,MOTRIN) 200 MG tablet Take 200 mg by mouth every 6 (six) hours as needed for pain.       predniSONE (DELTASONE) 10 mg tablet Once daily 30 tablet 1     No current facility-administered medications for this visit.      DETAILED EXAMINATION  10/01/18  :  Vitals:    10/01/18 0926   BP: 130/82   Patient Site: Right Arm   Patient Position: Sitting   Cuff Size: Adult Regular   Pulse: 84   Weight: 187 lb (84.8 kg)   Height: 5' 10\" (1.778 m)     Alert oriented. Head including the face is examined for malar rash, heliotropes, scarring, lupus pernio. Eyes examined for redness such as in episcleritis/scleritis, periorbital lesions.   Neck/ Face examined for parotid gland swelling, range of motion of neck.  Left upper and lower and right upper and lower extremities examined for tenderness, swelling, warmth of the appendicular joints, range of motion, edema, rash.  Some of the important findings included: He has no synovitis in any of the palpable joints in contrast to the past examination where he had synovitis in MCP 2 and 3 bilaterally, he does have minimal synovial thickening of the MCP #3 bilaterally.  LAB / IMAGING DATA:  ALT   Date Value Ref Range Status   08/27/2018 21 0 - 45 U/L Final     Albumin   Date Value Ref Range Status   08/27/2018 4.0 3.5 - 5.0 g/dL Final     Creatinine   Date Value Ref Range Status   08/27/2018 0.74 0.70 - 1.30 mg/dL Final       WBC   Date Value Ref Range Status   08/27/2018 8.7 4.0 - 11.0 thou/uL Final     Hemoglobin   Date Value Ref Range Status   08/27/2018 14.4 14.0 - 18.0 g/dL Final     Platelets   Date Value Ref Range Status   08/27/2018 171 140 - 440 thou/uL Final       Lab Results   Component Value Date    RF <15.0 08/27/2018    SEDRATE 2 08/27/2018          "

## 2021-06-20 NOTE — PROGRESS NOTES
ASSESSMENT AND PLAN:  Shaan Mart 53 y.o. male is seen here on 08/27/18 for evaluation of joint pains.  He has several signals which require looking into his joint symptoms further in depth.  1 of the key questions would be if he has sufficient evidence of inflammatory arthropathy.  He had x-rays of the hands and feet taken.  Personally review the films.  Findings: The joints are intact and the MCPs bilaterally without erosions or loss of space.  This is reassuring in view of the synovitis that he has on examination is noted in the relevant section.  Further workup including labs as noted.  Meanwhile our vascular say prednisone.  Major side effects include ocular metabolic bone related to reviewed.  We will meet again in 30 days.  In the interim he is going to monitor his symptoms especially morning stiffness, swelling and afternoon pain in his hands and feet.  Diagnoses and all orders for this visit:    Polyarthralgia  -     HM1(CBC and Differential)  -     Creatinine  -     ALT (SGPT)  -     Albumin  -     Rheumatoid Factor Quant  -     CCP Antibodies  -     Hepatitis C Antibody (Anti-HCV)  -     Uric Acid  -     Erythrocyte Sedimentation Rate  -     C-Reactive Protein  -     Anti-Neutrophil Cytoplasmic Antibody, IgG (ANCA IFA)  -     Antinuclear Antibody (JULISSA) Cascade  -     Hepatitis B Surface Antigen (HBsAG)  -     XR Hands Bilateral 3 or More VWS; Future; Expected date: 8/27/18  -     XR Feet Bilateral 3 Or More VWS; Future; Expected date: 8/27/18  -     XR Hands Bilateral 3 or More VWS  -     XR Feet Bilateral 3 Or More VWS  -     HM1 (CBC with Diff)  -     predniSONE (DELTASONE) 10 mg tablet; Once daily  Dispense: 30 tablet; Refill: 1    Polyarthritis  -     HM1(CBC and Differential)  -     Creatinine  -     ALT (SGPT)  -     Albumin  -     Rheumatoid Factor Quant  -     CCP Antibodies  -     Hepatitis C Antibody (Anti-HCV)  -     Uric Acid  -     Erythrocyte Sedimentation Rate  -     C-Reactive Protein  -      Anti-Neutrophil Cytoplasmic Antibody, IgG (ANCA IFA)  -     Antinuclear Antibody (JULISSA) Cascade  -     Hepatitis B Surface Antigen (HBsAG)  -     XR Hands Bilateral 3 or More VWS; Future; Expected date: 8/27/18  -     XR Feet Bilateral 3 Or More VWS; Future; Expected date: 8/27/18  -     XR Hands Bilateral 3 or More VWS  -     XR Feet Bilateral 3 Or More VWS  -     HM1 (CBC with Diff)  -     predniSONE (DELTASONE) 10 mg tablet; Once daily  Dispense: 30 tablet; Refill: 1    HISTORY OF PRESENTING ILLNESS:  Shaan Mart, 53 y.o., male is here for evaluation of painful joints.  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  He reports that he has had joint pains going back several years.  He feels it probably is at least 5 years.  He has pain in his hands, wrists, ankles, feet.  He has had multiple injuries.  This is affected his right wrist where he had surgery done last year.  He is also had a fall from a ladder after which he had swelling on the left wrist.  He also recalls injury to the left ankle when he strained it.  He has had 2 ACL repairs of the right knee.  In this background he reports that the pain in the hands, ankles and the feet is most prominent issue for him.  This is bothersome first thing in the morning after he has taken ibuprofen there is some relief.  But by afternoon he has more pain.  He noted pain level to be moderately severe more so on the left and the right hand, 8.0/10, interfering with a variety of day-to-day activities.  He has stiffness in the morning that can last easily an hour and 1 hour.  He is not a smoker alcohol occasionally.  The more he is using his joints the worse it gets.  He was at chiropractors.  He took x-rays of the hands, he also had an MRI of the left ankle not too long ago.  We are going to request the reports of the MRI.  He reports ibuprofen controls his symptoms.  He has seen swelling such as of the left wrist left ankle and the MCPs.  He reports no personal or family history of  psoriasis ulcerative colitis, Crohn's disease.  There is no family history of rheumatoid arthritis and ankylosing spondylitis or lupus.  He describes himself otherwise in good health.    Further historical information and ADL limitations as noted in the multidimensional health assessment questionnaire attached in the EMR. Rest of the 13 system ROS is negative.     ALLERGIES:Review of patient's allergies indicates no known allergies.    PAST MEDICAL/ACTIVE PROBLEMS/MEDICATION/ FAMILY HISTORY/SOCIAL DATA:  The patient has a family history of  No past medical history on file.  History   Smoking Status     Never Smoker   Smokeless Tobacco     Never Used     There is no problem list on file for this patient.    Current Outpatient Prescriptions   Medication Sig Dispense Refill     acetaminophen (TYLENOL) 325 MG tablet Take 650 mg by mouth every 6 (six) hours as needed for pain.       ibuprofen (ADVIL,MOTRIN) 200 MG tablet Take 200 mg by mouth every 6 (six) hours as needed for pain.       No current facility-administered medications for this visit.        COMPREHENSIVE EXAMINATION:  Vitals:    08/27/18 0813   BP: 132/88   Resp: 16   Weight: 187 lb (84.8 kg)     A well appearing alert oriented male. Vital data as noted above. His eyes without inflammation/scleromalacia. ENTwithout oral mucositis, thrush, nasal deformity, external ear redness, deformity. His neck is without lymphadenopathy and supple. Lungs normal sounds, no pleural rub. Heart auscultation normal rate, rhythm; no pericardial rub and murmurs. Abdomen soft, non tender, no organomegaly. Skin examined for heliotrope, malar area eruption, lupus pernio, periungual erythema, sclerodactyly, papules, erythema nodosum, purpura, nail pitting, onycholysis, and obvious psoriasis lesion. Neurological examination shows normal alertness, speech, facial symmetry, tone and power in upper and lower extremities, Tinel's and Phalen's at wrist and gait. The joint examination is  performed for swelling, tenderness, warmth, erythema, and range of motion in the following joints: DIPs, PIPs, MCPs, wrists, first CMC's, elbows, shoulders, hips, knees, ankles, feet; spine for range of motion and paraspinal muscles for tenderness. The salient normal / abnormal findings are appended.  He has synovitis, most prominently in his hands in the MCP #3 bilaterally more so on the left side than the right and MCP #2 again worse on the left side, he has tenderness of the left wrist he has scar on the right wrist dorsal aspect.  He has slight warmth and surgical swelling of the left ankle.  He has flexion contracture at his left second toe.  He has a JLT of the knees especially on  the right side.    LAB / IMAGING DATA:  No results found for: ALT, ALBUMIN, CREATININE    No results found for: WBC, HGB, PLT    No results found for: JULISSA, RF, SEDRATE

## 2021-06-21 ENCOUNTER — RECORDS - HEALTHEAST (OUTPATIENT)
Dept: RHEUMATOLOGY | Facility: CLINIC | Age: 56
End: 2021-06-21

## 2021-06-22 NOTE — PROGRESS NOTES
ASSESSMENT AND PLAN:  Shaan Mart 53 y.o. male is seen here on 01/07/19 for follow-up of inflammatory polyarthritis predominantly affecting his hands to Lasix and MTPs.  While he has improved with hydroxychloroquine turns out that he has been taking only 1 tablet daily.  He is going to go to twice daily.  Reminded of my examination.  We will meet here in 3 months or sooner.        Diagnoses and all orders for this visit:    Inflammatory polyarthritis (H)  -     hydroxychloroquine (PLAQUENIL) 200 mg tablet  Dispense: 180 tablet; Refill: 0    High risk medication use    Polyarthralgia  -     hydroxychloroquine (PLAQUENIL) 200 mg tablet  Dispense: 180 tablet; Refill: 0      HISTORY OF PRESENTING ILLNESS:  Shaan Mart, 53 y.o., male is here for follow-up.  He has inflammatory polyarthritis.  He has made improvement.  He has been on hydroxychloroquine 200 daily instead of 400.  He noted pain level at 2.0/10, mild in his hands and wrists, moderate in the left shoulder.  Able to do most of his day-to-day activities without difficulty morning stiffness of an hour.  There is no fever weight loss blurry vision eye redness mouth ulcer nausea.  While he is improved he is not to the same order as the improvement he noted with prednisone 10 mg daily. In  the past where he had pain level as high as 8/10 and interfered with a variety of day-to-day activities.  His morning stiffness is now nonexistent compared with an hour few weeks ago.  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  He reports that he has had joint pains going back several years.  He feels it probably is at least 5 years.  He has pain in his hands, wrists, ankles, feet.  He has had multiple injuries.  This is affected his right wrist where he had surgery done last year.  He is also had a fall from a ladder after which he had swelling on the left wrist.  He also recalls injury to the left ankle when he strained it.  He has had 2 ACL repairs of the right knee.  In this  background he reports that the pain in the hands, ankles and the feet is most prominent issue for him.  This is bothersome first thing in the morning after he has taken ibuprofen there is some relief.  But by afternoon he has more pain.  He noted pain level to be moderately severe more so on the left and the right hand, 8.0/10, interfering with a variety of day-to-day activities.  He has stiffness in the morning that can last easily an hour and 1 hour.  He is not a smoker alcohol occasionally.  The more he is using his joints the worse it gets.  He was at chiropractors.  He took x-rays of the hands, he also had an MRI of the left ankle not too long ago.  We are going to request the reports of the MRI.  He reports ibuprofen controls his symptoms.  He has seen swelling such as of the left wrist left ankle and the MCPs.  He reports no personal or family history of psoriasis ulcerative colitis, Crohn's disease.  There is no family history of rheumatoid arthritis and ankylosing spondylitis or lupus.  He describes himself otherwise in good health.    Further historical information and ADL limitations as noted in the multidimensional health assessment questionnaire attached in the EMR.   ALLERGIES:Patient has no known allergies.    PAST MEDICAL/ACTIVE PROBLEMS/MEDICATION/ FAMILY HISTORY/SOCIAL DATA:  The patient has a family history of  No past medical history on file.  Social History     Tobacco Use   Smoking Status Never Smoker   Smokeless Tobacco Never Used     Patient Active Problem List   Diagnosis     Polyarthralgia     Inflammatory polyarthritis (H)     High risk medication use     Current Outpatient Medications   Medication Sig Dispense Refill     acetaminophen (TYLENOL) 325 MG tablet Take 650 mg by mouth every 6 (six) hours as needed for pain.       hydroxychloroquine (PLAQUENIL) 200 mg tablet Take 200 mg by mouth 2 (two) times a day.       ibuprofen (ADVIL,MOTRIN) 200 MG tablet Take 200 mg by mouth every 6 (six)  hours as needed for pain.       No current facility-administered medications for this visit.      DETAILED EXAMINATION  01/07/19  :  Vitals:    01/07/19 1045   BP: 128/80   Patient Site: Right Arm   Patient Position: Sitting   Cuff Size: Adult Regular   Pulse: 76   Weight: 187 lb (84.8 kg)     Alert oriented. Head including the face is examined for malar rash, heliotropes, scarring, lupus pernio. Eyes examined for redness such as in episcleritis/scleritis, periorbital lesions.   Neck/ Face examined for parotid gland swelling, range of motion of neck.  Left upper and lower and right upper and lower extremities examined for tenderness, swelling, warmth of the appendicular joints, range of motion, edema, rash.  Some of the important findings included: He has subtle but definite swelling such as it is MCPs #2 and 3 especially the latter on both sides.  These are minimally tender.  Knees without JLT bony hypertrophy right knee medial aspect.  He does not have dactylitis.        LAB / IMAGING DATA:  ALT   Date Value Ref Range Status   08/27/2018 21 0 - 45 U/L Final     Albumin   Date Value Ref Range Status   08/27/2018 4.0 3.5 - 5.0 g/dL Final     Creatinine   Date Value Ref Range Status   08/27/2018 0.74 0.70 - 1.30 mg/dL Final       WBC   Date Value Ref Range Status   08/27/2018 8.7 4.0 - 11.0 thou/uL Final     Hemoglobin   Date Value Ref Range Status   08/27/2018 14.4 14.0 - 18.0 g/dL Final     Platelets   Date Value Ref Range Status   08/27/2018 171 140 - 440 thou/uL Final       Lab Results   Component Value Date    RF <15.0 08/27/2018    SEDRATE 2 08/27/2018

## 2021-09-22 ENCOUNTER — TRANSFERRED RECORDS (OUTPATIENT)
Dept: HEALTH INFORMATION MANAGEMENT | Facility: CLINIC | Age: 56
End: 2021-09-22

## 2021-09-22 ENCOUNTER — TELEPHONE (OUTPATIENT)
Dept: RHEUMATOLOGY | Facility: CLINIC | Age: 56
End: 2021-09-22

## 2021-09-22 DIAGNOSIS — M06.4 INFLAMMATORY POLYARTHRITIS (H): Primary | ICD-10-CM

## 2021-09-22 LAB — ALT SERPL-CCNC: 25 U/L

## 2021-09-22 NOTE — TELEPHONE ENCOUNTER
Pt states he went in today to get Dr. Flores's labs completed and the clinic noticed that the current orders they have are now .  They have drawn the lab but needs new orders to process the labs.      Please send new standing lab orders to  [facility]  Fax: [phone]    Pt would like a call back to let him know once lab orders has been faxed.

## 2021-10-12 DIAGNOSIS — M06.4 INFLAMMATORY POLYARTHRITIS (H): Primary | ICD-10-CM

## 2021-10-12 RX ORDER — METHOTREXATE 2.5 MG/1
TABLET ORAL
Qty: 48 TABLET | Refills: 0 | Status: SHIPPED | OUTPATIENT
Start: 2021-10-12 | End: 2021-12-06

## 2021-11-10 DIAGNOSIS — M06.4 INFLAMMATORY POLYARTHRITIS (H): Primary | ICD-10-CM

## 2021-11-10 RX ORDER — FOLIC ACID 1 MG/1
TABLET ORAL
Qty: 90 TABLET | Refills: 0 | Status: SHIPPED | OUTPATIENT
Start: 2021-11-10 | End: 2022-02-14

## 2021-12-03 DIAGNOSIS — M06.4 INFLAMMATORY POLYARTHRITIS (H): Primary | ICD-10-CM

## 2021-12-03 RX ORDER — HYDROXYCHLOROQUINE SULFATE 200 MG/1
TABLET, FILM COATED ORAL
Qty: 60 TABLET | Refills: 0 | Status: SHIPPED | OUTPATIENT
Start: 2021-12-03 | End: 2021-12-21

## 2021-12-06 DIAGNOSIS — M06.4 INFLAMMATORY POLYARTHRITIS (H): ICD-10-CM

## 2021-12-06 RX ORDER — METHOTREXATE 2.5 MG/1
TABLET ORAL
Qty: 24 TABLET | Refills: 0 | Status: SHIPPED | OUTPATIENT
Start: 2021-12-06 | End: 2022-01-03

## 2021-12-07 ENCOUNTER — TRANSFERRED RECORDS (OUTPATIENT)
Dept: HEALTH INFORMATION MANAGEMENT | Facility: CLINIC | Age: 56
End: 2021-12-07
Payer: COMMERCIAL

## 2021-12-07 LAB
ALT SERPL-CCNC: 22 U/L
CREATININE (EXTERNAL): 0.7 MG/DL (ref 0.7–1.4)

## 2021-12-21 ENCOUNTER — VIRTUAL VISIT (OUTPATIENT)
Dept: RHEUMATOLOGY | Facility: CLINIC | Age: 56
End: 2021-12-21
Payer: COMMERCIAL

## 2021-12-21 DIAGNOSIS — M25.50 POLYARTHRALGIA: ICD-10-CM

## 2021-12-21 DIAGNOSIS — M06.4 INFLAMMATORY POLYARTHRITIS (H): Primary | ICD-10-CM

## 2021-12-21 DIAGNOSIS — Z79.899 HIGH RISK MEDICATION USE: ICD-10-CM

## 2021-12-21 PROCEDURE — 99214 OFFICE O/P EST MOD 30 MIN: CPT | Mod: 95 | Performed by: INTERNAL MEDICINE

## 2021-12-21 RX ORDER — HYDROXYCHLOROQUINE SULFATE 200 MG/1
200 TABLET, FILM COATED ORAL 2 TIMES DAILY
Qty: 180 TABLET | Refills: 5 | Status: SHIPPED | OUTPATIENT
Start: 2021-12-21

## 2021-12-21 NOTE — PROGRESS NOTES
Shaan is a 56 year old who is being evaluated via a billable video visit.      How would you like to obtain your AVS? MyChart  If the video visit is dropped, the invitation should be resent by: Text to cell phone: [phone]  Will anyone else be joining your video visit? No      Video end Time: 8:07 AM  Video-Visit Details    Type of service:  Video Visit    Video startTime:7:58am    Originating Location (pt. Location): Other In [place]    Distant Location (provider location):  [facility]     Platform used for Video Visit: Application Craft      This document was created using a software with less than 100% fidelity, at times resulting in unintended, even erroneous syntax and grammar.  The reader is advised to keep this under consideration while reviewing, interpreting this note.           ASSESSMENT AND PLAN:    Diagnoses and all orders for this visit:  Inflammatory polyarthritis (H)  -     hydroxychloroquine (PLAQUENIL) 200 MG tablet; Take 1 tablet (200 mg) by mouth 2 times daily  High risk medication use  Polyarthralgia  -     hydroxychloroquine (PLAQUENIL) 200 MG tablet; Take 1 tablet (200 mg) by mouth 2 times daily      Follow up in 6 months      HISTORY OF PRESENTING ILLNESS:  Shaan Mart 56 y.o. is evaluated here via video/audio link. He is here in [place] for this visit. 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 He is on methotrexate, hydroxychloroquine.  He had his eyes examined 2 weeks ago.  He also had his labs drawn at the same time those data will be requested.  He reports no flareup of his joint symptoms.  He is fully vaccinated.  He reports no flareup of his joint pains.  He is able to do all his day-to-day activities without difficulty.       ROS enquiry held for fever, ocular symptoms, rash,  headache, GI issues.  Today we also discussed the issues related to the current pandemic, the pros and cons of the current treatment plan, the CDC guidelines such as social distancing washing the hands covering the cough.  ALLERGIES:Patient has no known allergies.     ROS enquiry held for fever, ocular symptoms, rash, headache,  GI issues.  Today we also discussed the issues related to the current pandemic, the pros and cons of the current treatment plan, the CDC guidelines such as social distancing washing the hands covering the cough.  ALLERGIES:Patient has no known allergies.    PAST MEDICAL/ACTIVE PROBLEMS/MEDICATION/SOCIAL DATA  No past medical history on file.  History   Smoking Status     Never Smoker   Smokeless Tobacco     Never Used     Patient Active Problem List   Diagnosis     Polyarthralgia     Inflammatory polyarthritis (H)     High risk medication use     Current Outpatient Medications   Medication Sig Dispense Refill     acetaminophen (TYLENOL) 325 MG tablet [ACETAMINOPHEN (TYLENOL) 325 MG TABLET] Take 650 mg by mouth every 6 (six) hours as needed for pain.       folic acid (FOLVITE) 1 MG tablet TAKE ONE (1) TABLET (1 MG TOTAL) BY MOUTH DAILY. 90 tablet 0     hydroxychloroquine (PLAQUENIL) 200 MG tablet TAKE ONE (1) TABLET (200 MG TOTAL) BY MOUTH TWO (2) (TWO) TIMES A DAY. 60 tablet 0     ibuprofen (ADVIL,MOTRIN) 200 MG tablet [IBUPROFEN (ADVIL,MOTRIN) 200 MG TABLET] Take 200 mg by mouth every 6 (six) hours as needed for pain.       methotrexate sodium 2.5 MG TABS TAKE SIX (6) TABLETS (15 MG TOTAL) BY MOUTH ONCE A WEEK. 24 tablet 0     folic acid (FOLVITE) 1 MG tablet [FOLIC ACID (FOLVITE) 1 MG TABLET] Take 1 tablet (1 mg total) by mouth daily. 100 tablet 0     folic acid (FOLVITE) 1 MG tablet [FOLIC ACID (FOLVITE) 1 MG TABLET] TAKE 1 TABLET (1 MG TOTAL) BY MOUTH DAILY. 100 tablet 0     hydrOXYchloroQUINE (PLAQUENIL) 200 mg tablet [HYDROXYCHLOROQUINE (PLAQUENIL) 200 MG TABLET] Take 1 tablet (200 mg  total) by mouth 2 (two) times a day. 180 tablet 0     hydrOXYchloroQUINE (PLAQUENIL) 200 mg tablet [HYDROXYCHLOROQUINE (PLAQUENIL) 200 MG TABLET] TAKE 1 TABLET (200 MG TOTAL) BY MOUTH 2 (TWO) TIMES A DAY. 180 tablet 0     methotrexate 2.5 MG tablet [METHOTREXATE 2.5 MG TABLET] Take 6 tablets (15 mg total) by mouth once a week. 72 tablet 0     methotrexate 2.5 MG tablet [METHOTREXATE 2.5 MG TABLET] TAKE 6 TABLETS (15 MG TOTAL) BY MOUTH ONCE A WEEK. 72 tablet 0         EXAMINATION:    Using the audio and video link as best as possible the constitutional, neck, neurologic, psych, skin, both upper extremities areas/organ system were evaluated during this assessment.  Some of the important findings: Alert, oriented, speech fluent.    Able to fully flex the digits, into fists bilaterally, wrist and elbow range of motion appear normal, abduction of the shoulder is normal.      LAB / IMAGING DATA:  ALT   Date Value Ref Range Status   05/29/2019 13 0 - 45 U/L Final   08/27/2018 21 0 - 45 U/L Final     ALT_EXT   Date Value Ref Range Status   06/02/2021 39 <50 U/L Final   03/02/2021 24 <50 U/L Final   12/04/2020 21 <50 U/L Final     Albumin   Date Value Ref Range Status   05/29/2019 3.1 (L) 3.5 - 5.0 g/dL Final   08/27/2018 4.0 3.5 - 5.0 g/dL Final       WBC   Date Value Ref Range Status   05/29/2019 7.3 4.0 - 11.0 thou/uL Final   08/27/2018 8.7 4.0 - 11.0 thou/uL Final     Hemoglobin   Date Value Ref Range Status   05/29/2019 12.3 (L) 14.0 - 18.0 g/dL Final   08/27/2018 14.4 14.0 - 18.0 g/dL Final     Platelet Count   Date Value Ref Range Status   05/29/2019 448 (H) 140 - 440 thou/uL Final   08/27/2018 171 140 - 440 thou/uL Final       No results found for: JULISSA

## 2022-01-03 DIAGNOSIS — M06.4 INFLAMMATORY POLYARTHRITIS (H): ICD-10-CM

## 2022-01-03 RX ORDER — METHOTREXATE 2.5 MG/1
TABLET ORAL
Qty: 48 TABLET | Refills: 0 | Status: SHIPPED | OUTPATIENT
Start: 2022-01-03 | End: 2022-03-09

## 2022-02-14 DIAGNOSIS — M06.4 INFLAMMATORY POLYARTHRITIS (H): ICD-10-CM

## 2022-02-14 RX ORDER — FOLIC ACID 1 MG/1
TABLET ORAL
Qty: 90 TABLET | Refills: 0 | Status: SHIPPED | OUTPATIENT
Start: 2022-02-14